# Patient Record
Sex: MALE | HISPANIC OR LATINO | Employment: FULL TIME | URBAN - METROPOLITAN AREA
[De-identification: names, ages, dates, MRNs, and addresses within clinical notes are randomized per-mention and may not be internally consistent; named-entity substitution may affect disease eponyms.]

---

## 2019-08-29 ENCOUNTER — OFFICE VISIT (OUTPATIENT)
Dept: CARDIOLOGY | Facility: MEDICAL CENTER | Age: 59
End: 2019-08-29
Payer: COMMERCIAL

## 2019-08-29 VITALS
OXYGEN SATURATION: 96 % | HEIGHT: 61 IN | HEART RATE: 60 BPM | DIASTOLIC BLOOD PRESSURE: 80 MMHG | WEIGHT: 170 LBS | SYSTOLIC BLOOD PRESSURE: 154 MMHG | BODY MASS INDEX: 32.1 KG/M2

## 2019-08-29 DIAGNOSIS — I45.10 RBBB: ICD-10-CM

## 2019-08-29 DIAGNOSIS — R00.2 PALPITATION: ICD-10-CM

## 2019-08-29 DIAGNOSIS — I10 ESSENTIAL HYPERTENSION, BENIGN: ICD-10-CM

## 2019-08-29 LAB — EKG IMPRESSION: NORMAL

## 2019-08-29 PROCEDURE — 99244 OFF/OP CNSLTJ NEW/EST MOD 40: CPT | Performed by: INTERNAL MEDICINE

## 2019-08-29 PROCEDURE — 93000 ELECTROCARDIOGRAM COMPLETE: CPT | Performed by: INTERNAL MEDICINE

## 2019-08-29 RX ORDER — PROPRANOLOL HYDROCHLORIDE 10 MG/1
10 TABLET ORAL 3 TIMES DAILY
Qty: 90 TAB | Refills: 11 | Status: SHIPPED | OUTPATIENT
Start: 2019-08-29 | End: 2020-09-23

## 2019-08-29 RX ORDER — PROPRANOLOL HYDROCHLORIDE 160 MG/1
160 CAPSULE, EXTENDED RELEASE ORAL
COMMUNITY
End: 2021-12-14

## 2019-08-29 RX ORDER — LOSARTAN POTASSIUM AND HYDROCHLOROTHIAZIDE 25; 100 MG/1; MG/1
1 TABLET ORAL DAILY
COMMUNITY

## 2019-08-29 RX ORDER — AMLODIPINE BESYLATE 10 MG/1
10 TABLET ORAL DAILY
COMMUNITY

## 2019-08-29 ASSESSMENT — ENCOUNTER SYMPTOMS
PALPITATIONS: 1
EYES NEGATIVE: 1
WEIGHT LOSS: 0
NECK PAIN: 1
RESPIRATORY NEGATIVE: 1
GASTROINTESTINAL NEGATIVE: 1
NEUROLOGICAL NEGATIVE: 1
PSYCHIATRIC NEGATIVE: 1

## 2019-08-29 NOTE — PROGRESS NOTES
Chief Complaint   Patient presents with   • Palpitations     new patient       Subjective:   David Barrett is a 58 y.o. male who presents today for evaluation of above issues    He is seen in consultation at the request of Dr. Cathie Ramos for palpitations and abnormal EKG.  He reports occasional palpitations, especially when he drinks ETOH.  They occur once every month or two, lasting upto several hours  He denies any associated symptoms.  He has no limitation, working in the kitchen.    He has HTN for about 5 years.  BP at home per report has mostly been in the range but he thinks would go up in the 150-160 when he gets upset.  His PCP thinks he has some component of white coat hypertension.  There has been no recent medication change.     He was seen by a cardiologist in [facility] last month. He stated that he had several tests performed including echocardiography and was told that his heart was fine.    Past Medical History:   Diagnosis Date   • Arthritis    • Essential hypertension 2/19/2016   • HTN (hypertension)    • SVT (supraventricular tachycardia) (HCC) 2/19/2016     History reviewed. No pertinent surgical history.  History reviewed. No pertinent family history.  Social History     Socioeconomic History   • Marital status:      Spouse name: Not on file   • Number of children: Not on file   • Years of education: Not on file   • Highest education level: Not on file   Occupational History   • Not on file   Social Needs   • Financial resource strain: Not on file   • Food insecurity:     Worry: Not on file     Inability: Not on file   • Transportation needs:     Medical: Not on file     Non-medical: Not on file   Tobacco Use   • Smoking status: Never Smoker   • Smokeless tobacco: Never Used   Substance and Sexual Activity   • Alcohol use: No   • Drug use: No   • Sexual activity: Not on file   Lifestyle   • Physical activity:     Days per week: Not on file     Minutes per session: Not on file   •  Stress: Not on file   Relationships   • Social connections:     Talks on phone: Not on file     Gets together: Not on file     Attends Yarsani service: Not on file     Active member of club or organization: Not on file     Attends meetings of clubs or organizations: Not on file     Relationship status: Not on file   • Intimate partner violence:     Fear of current or ex partner: Not on file     Emotionally abused: Not on file     Physically abused: Not on file     Forced sexual activity: Not on file   Other Topics Concern   • Not on file   Social History Narrative   • Not on file     Not on File  Outpatient Encounter Medications as of 8/29/2019   Medication Sig Dispense Refill   • amLODIPine (NORVASC) 10 MG Tab Take 10 mg by mouth every day.     • propranolol CR (INDERAL LA) 160 MG CAPSULE SR 24 HR capsule Take 160 mg by mouth.     • losartan-hydrochlorothiazide (HYZAAR) 100-25 MG per tablet Take 1 Tab by mouth every day.     • atorvastatin (LIPITOR) 40 MG Tab Take 40 mg by mouth every bedtime.     • allopurinol (ZYLOPRIM) 300 MG Tab Take 300 mg by mouth every bedtime.     • omeprazole (PRILOSEC) 20 MG delayed-release capsule Take 1 Cap by mouth every day. (Patient not taking: Reported on 8/29/2019) 30 Cap 0   • [DISCONTINUED] diltiazem (CARDIZEM) 120 MG Tab Take 1 Tab by mouth every day. (Patient not taking: Reported on 8/29/2019) 30 Tab 0   • meloxicam (MOBIC) 15 MG tablet Take 15 mg by mouth every day.     • [DISCONTINUED] losartan (COZAAR) 100 MG Tab Take 100 mg by mouth every day.     • [DISCONTINUED] hydrochlorothiazide (HYDRODIURIL) 12.5 MG tablet Take 12.5 mg by mouth every day.       No facility-administered encounter medications on file as of 8/29/2019.      Review of Systems   Constitutional: Negative for malaise/fatigue and weight loss.   HENT: Negative.    Eyes: Negative.    Respiratory: Negative.    Cardiovascular: Positive for palpitations. Negative for chest pain.   Gastrointestinal: Negative.   "  Genitourinary: Negative.    Musculoskeletal: Positive for neck pain.   Skin: Negative.    Neurological: Negative.    Endo/Heme/Allergies: Negative.    Psychiatric/Behavioral: Negative.    All other systems reviewed and are negative.       Objective:   /80 (BP Location: Left arm, Patient Position: Sitting)   Pulse 60   Ht 1.549 m (5' 1\")   Wt 77.1 kg (170 lb)   SpO2 96%   BMI 32.12 kg/m²     Physical Exam   Constitutional: He is oriented to person, place, and time. No distress.   HENT:   Head: Normocephalic and atraumatic.   Eyes: Right eye exhibits no discharge. Left eye exhibits no discharge.   Neck: No JVD present. No thyromegaly present.   Cardiovascular: Normal rate and regular rhythm. Exam reveals no gallop.   No murmur heard.  Pulmonary/Chest: Effort normal. No respiratory distress. He has no wheezes. He has no rales.   Abdominal: Soft.   Musculoskeletal: He exhibits no edema.   Neurological: He is alert and oriented to person, place, and time.   Skin: Skin is warm. No erythema.   Psychiatric: He has a normal mood and affect.     EKG today by my review showed sinus bradycardia, first degree AV block and RBBB    CXR 2016 showed no cardiomegaly    Assessment:     1. Palpitations    2. RBBB     3. Essential hypertension, benign         Medical Decision Making:  Today's Assessment / Status / Plan:     He described intermittent tachycardia relatively long lasting but somewhat infrequent.   He is already taking propranolol with slow resting heart rate.  Will arrange for event monitor.  He is rather concerned about them. Will add propanolol 10 mg PRN for now..  Will try to obtain record on those tests from [facility].  Will try to obtain his record from [facility] for rview.  His BP is somewhat elevated but there is suggestion of white coat syndrome in the past.  Will continue current medications and closely monitor for now.  Will have the patient return for a followup in 4-6 weeks. Will be happy to " see the patient sooner as needed. Thank you for allowing me to participate in the caring of this patient.

## 2019-08-29 NOTE — PROGRESS NOTES
Pt is seen in this office.  Per MD recommendations, pt to have lifewatch monitor.    Lifewatch Monitor ordered.

## 2019-09-25 ENCOUNTER — NON-PROVIDER VISIT (OUTPATIENT)
Dept: CARDIOLOGY | Facility: MEDICAL CENTER | Age: 59
End: 2019-09-25
Payer: COMMERCIAL

## 2019-09-25 ENCOUNTER — TELEPHONE (OUTPATIENT)
Dept: CARDIOLOGY | Facility: MEDICAL CENTER | Age: 59
End: 2019-09-25

## 2019-09-25 DIAGNOSIS — R00.2 PALPITATION: ICD-10-CM

## 2019-09-25 DIAGNOSIS — R00.1 SINUS BRADYCARDIA: ICD-10-CM

## 2019-09-25 DIAGNOSIS — I45.10 RBBB: ICD-10-CM

## 2019-09-25 PROCEDURE — 93268 ECG RECORD/REVIEW: CPT | Performed by: INTERNAL MEDICINE

## 2019-09-25 NOTE — TELEPHONE ENCOUNTER
Patient enrolled in the 21 day [employer] [facility] Heart monitoring program.  >In office hookup with Baseline transmitted.  >Pending EOS.

## 2019-10-22 ENCOUNTER — TELEPHONE (OUTPATIENT)
Dept: CARDIOLOGY | Facility: MEDICAL CENTER | Age: 59
End: 2019-10-22

## 2019-10-22 NOTE — TELEPHONE ENCOUNTER
----- Message from Stephani Gruber M.D. sent at 10/17/2019  1:20 PM PDT -----  Monitor showed occ slow HR but mostly during sleep  No other abn heart rhythm  Monitor for now unless symptoms worsen      =======================================================      This covering RN today attempted to call pt, no answer, left vm to call back     Pt have appt tomorrow 10/23/19 w/ CR

## 2019-10-23 ENCOUNTER — OFFICE VISIT (OUTPATIENT)
Dept: CARDIOLOGY | Facility: MEDICAL CENTER | Age: 59
End: 2019-10-23
Payer: COMMERCIAL

## 2019-10-23 VITALS
WEIGHT: 178 LBS | BODY MASS INDEX: 33.63 KG/M2 | HEART RATE: 54 BPM | DIASTOLIC BLOOD PRESSURE: 60 MMHG | SYSTOLIC BLOOD PRESSURE: 140 MMHG | OXYGEN SATURATION: 97 %

## 2019-10-23 DIAGNOSIS — I45.10 RBBB: ICD-10-CM

## 2019-10-23 DIAGNOSIS — R00.2 PALPITATION: ICD-10-CM

## 2019-10-23 DIAGNOSIS — I10 ESSENTIAL HYPERTENSION, BENIGN: ICD-10-CM

## 2019-10-23 PROCEDURE — 99214 OFFICE O/P EST MOD 30 MIN: CPT | Performed by: INTERNAL MEDICINE

## 2019-10-23 ASSESSMENT — ENCOUNTER SYMPTOMS
SHORTNESS OF BREATH: 0
DIZZINESS: 0
PALPITATIONS: 0

## 2019-10-23 NOTE — PROGRESS NOTES
Chief Complaint   Patient presents with   • Palpitations     follow up (used tranlation line cart)   HTN    Subjective:   David Barrett is a 58 y.o. male who presents today for f/u above issues    Feeling better  He thinks taking short acting propranolol is helping  He has been it TID in addition to propranolol  mg/d  Event monitor earlier this month showed occ bradycardia during sleep but no tahcyaarhythmias  BP at home usually in the 120     Past Medical History:   Diagnosis Date   • Arthritis    • Essential hypertension 2/19/2016   • HTN (hypertension)    • SVT (supraventricular tachycardia) (HCC) 2/19/2016     History reviewed. No pertinent surgical history.  History reviewed. No pertinent family history.  Social History     Socioeconomic History   • Marital status:      Spouse name: Not on file   • Number of children: Not on file   • Years of education: Not on file   • Highest education level: Not on file   Occupational History   • Not on file   Social Needs   • Financial resource strain: Not on file   • Food insecurity:     Worry: Not on file     Inability: Not on file   • Transportation needs:     Medical: Not on file     Non-medical: Not on file   Tobacco Use   • Smoking status: Never Smoker   • Smokeless tobacco: Never Used   Substance and Sexual Activity   • Alcohol use: No   • Drug use: No   • Sexual activity: Not on file   Lifestyle   • Physical activity:     Days per week: Not on file     Minutes per session: Not on file   • Stress: Not on file   Relationships   • Social connections:     Talks on phone: Not on file     Gets together: Not on file     Attends Zoroastrianism service: Not on file     Active member of club or organization: Not on file     Attends meetings of clubs or organizations: Not on file     Relationship status: Not on file   • Intimate partner violence:     Fear of current or ex partner: Not on file     Emotionally abused: Not on file     Physically abused: Not on file      Forced sexual activity: Not on file   Other Topics Concern   • Not on file   Social History Narrative   • Not on file     Not on File  Outpatient Encounter Medications as of 10/23/2019   Medication Sig Dispense Refill   • amLODIPine (NORVASC) 10 MG Tab Take 10 mg by mouth every day.     • propranolol CR (INDERAL LA) 160 MG CAPSULE SR 24 HR capsule Take 160 mg by mouth.     • losartan-hydrochlorothiazide (HYZAAR) 100-25 MG per tablet Take 1 Tab by mouth every day.     • propranolol (INDERAL) 10 MG Tab Take 1 Tab by mouth 3 times a day. 90 Tab 11   • atorvastatin (LIPITOR) 40 MG Tab Take 40 mg by mouth every bedtime.     • allopurinol (ZYLOPRIM) 300 MG Tab Take 300 mg by mouth every bedtime.     • omeprazole (PRILOSEC) 20 MG delayed-release capsule Take 1 Cap by mouth every day. (Patient not taking: Reported on 8/29/2019) 30 Cap 0   • meloxicam (MOBIC) 15 MG tablet Take 15 mg by mouth every day.       No facility-administered encounter medications on file as of 10/23/2019.      Review of Systems   Constitutional: Negative for malaise/fatigue.   Respiratory: Negative for shortness of breath.    Cardiovascular: Negative for chest pain and palpitations.   Neurological: Negative for dizziness.   Psychiatric/Behavioral:        Usually feels anxious in MD's office        Objective:   /60 (BP Location: Left arm, Patient Position: Sitting)   Pulse (!) 54   Wt 80.7 kg (178 lb)   SpO2 97%   BMI 33.63 kg/m²     Physical Exam   Constitutional: He is oriented to person, place, and time. No distress.   HENT:   Head: Atraumatic.   Eyes: Left eye exhibits discharge.   Neck: No JVD present. No thyromegaly present.   Cardiovascular: Regular rhythm. Exam reveals no gallop.   No murmur heard.  Pulmonary/Chest: Effort normal and breath sounds normal.   Abdominal: Soft. He exhibits no distension.   Musculoskeletal: He exhibits no edema.   Neurological: He is alert and oriented to person, place, and time.   Skin: Skin is warm.    Psychiatric: His behavior is normal.       Assessment:     1. Palpitation     2. Essential hypertension, benign     3. RBBB         Medical Decision Making:  Today's Assessment / Status / Plan:     The patient's above cardiovascular conditions are stable.   Will continue current medications and have the patient return for a followup in 6 months.   Will be happy to see the patient sooner as needed.   Thank you for allowing me to participate in the caring of this patient.

## 2020-09-20 DIAGNOSIS — I10 ESSENTIAL HYPERTENSION: ICD-10-CM

## 2020-09-20 DIAGNOSIS — I47.10 SVT (SUPRAVENTRICULAR TACHYCARDIA) (HCC): ICD-10-CM

## 2020-09-24 RX ORDER — PROPRANOLOL HYDROCHLORIDE 10 MG/1
TABLET ORAL
Qty: 90 TAB | Refills: 0 | Status: SHIPPED | OUTPATIENT
Start: 2020-09-24 | End: 2021-08-04 | Stop reason: SDUPTHER

## 2021-03-15 DIAGNOSIS — Z23 NEED FOR VACCINATION: ICD-10-CM

## 2021-08-04 DIAGNOSIS — I47.10 SVT (SUPRAVENTRICULAR TACHYCARDIA) (HCC): ICD-10-CM

## 2021-08-04 DIAGNOSIS — I10 ESSENTIAL HYPERTENSION: ICD-10-CM

## 2021-08-04 RX ORDER — PROPRANOLOL HYDROCHLORIDE 10 MG/1
10 TABLET ORAL 3 TIMES DAILY
Qty: 90 TABLET | Refills: 0 | Status: SHIPPED | OUTPATIENT
Start: 2021-08-04

## 2021-08-04 NOTE — TELEPHONE ENCOUNTER
Lia Montoya, Med Ass't  You 1 hour ago (1:52 PM)      Schedulers have been sent a message to follow up with patient for F/V appointment. Additional note has been placed for patient to schedule follow up visit and [employer] message has been sent. 30 day supply issued.  Thank you

## 2021-08-10 ENCOUNTER — TELEPHONE (OUTPATIENT)
Dept: CARDIOLOGY | Facility: MEDICAL CENTER | Age: 61
End: 2021-08-10

## 2021-08-10 NOTE — TELEPHONE ENCOUNTER
Left voicemail to schedule a follow up visit via .   SLC      ----- Message from Esvin Piper Ass't sent at 8/4/2021  1:50 PM PDT -----  Regarding: Need Follow Appointment  Please contact patient to schedule follow up appointment. Thank you

## 2021-10-08 DIAGNOSIS — I47.10 SVT (SUPRAVENTRICULAR TACHYCARDIA) (HCC): ICD-10-CM

## 2021-10-08 DIAGNOSIS — I10 ESSENTIAL HYPERTENSION: ICD-10-CM

## 2021-10-08 NOTE — LETTER
October 12, 2021                David Barrett      [address]      [place] [address]        David:      No podemos volver a llenar gupta propanolol en lois momento. Debe ser visto anualmente para que se vuelvan a surtir earl medicamentos recetados. Llame a [facility] para hacer carina fidel al [phone].     También, puede solicitar resurtidos a gupta médico de atención primaria si los ve con más frecuencia.    Kierra,     [facility]

## 2021-10-12 RX ORDER — PROPRANOLOL HYDROCHLORIDE 10 MG/1
TABLET ORAL
Qty: 90 TABLET | Refills: 2 | OUTPATIENT
Start: 2021-10-12

## 2021-10-12 NOTE — TELEPHONE ENCOUNTER
Last seen by Dr. Gruber (who is no longer here) 10/2019.     Scheduling contacted pt via interpretor 8/10 and advised that a follow up appt was needed.     Cannot refill medication at this time.    Letter (translated in Turkmen) mailed to patients home address regarding the above and with contact information included. .

## 2021-12-14 ENCOUNTER — OFFICE VISIT (OUTPATIENT)
Dept: CARDIOLOGY | Facility: MEDICAL CENTER | Age: 61
End: 2021-12-14
Payer: COMMERCIAL

## 2021-12-14 VITALS
HEART RATE: 62 BPM | RESPIRATION RATE: 16 BRPM | WEIGHT: 192 LBS | OXYGEN SATURATION: 99 % | HEIGHT: 63 IN | SYSTOLIC BLOOD PRESSURE: 170 MMHG | BODY MASS INDEX: 34.02 KG/M2 | DIASTOLIC BLOOD PRESSURE: 80 MMHG

## 2021-12-14 DIAGNOSIS — I47.10 SVT (SUPRAVENTRICULAR TACHYCARDIA) (HCC): ICD-10-CM

## 2021-12-14 DIAGNOSIS — R07.89 OTHER CHEST PAIN: Primary | ICD-10-CM

## 2021-12-14 DIAGNOSIS — I10 ESSENTIAL HYPERTENSION: ICD-10-CM

## 2021-12-14 DIAGNOSIS — R00.2 PALPITATIONS: ICD-10-CM

## 2021-12-14 DIAGNOSIS — I45.10 RIGHT BUNDLE BRANCH BLOCK (RBBB): ICD-10-CM

## 2021-12-14 DIAGNOSIS — R94.31 NONSPECIFIC ABNORMAL ELECTROCARDIOGRAM (ECG) (EKG): ICD-10-CM

## 2021-12-14 LAB — EKG IMPRESSION: NORMAL

## 2021-12-14 PROCEDURE — 93000 ELECTROCARDIOGRAM COMPLETE: CPT | Performed by: INTERNAL MEDICINE

## 2021-12-14 PROCEDURE — 99215 OFFICE O/P EST HI 40 MIN: CPT | Performed by: INTERNAL MEDICINE

## 2021-12-14 RX ORDER — LISINOPRIL 10 MG/1
10 TABLET ORAL DAILY
Qty: 30 TABLET | Refills: 11 | Status: SHIPPED | OUTPATIENT
Start: 2021-12-14

## 2021-12-14 ASSESSMENT — ENCOUNTER SYMPTOMS
EYES NEGATIVE: 1
DEPRESSION: 0
DIZZINESS: 0
WEAKNESS: 0
INSOMNIA: 1
BLURRED VISION: 0
NERVOUS/ANXIOUS: 1
VOMITING: 0
ABDOMINAL PAIN: 0
FEVER: 0
BRUISES/BLEEDS EASILY: 0
HEADACHES: 0
FOCAL WEAKNESS: 0
NAUSEA: 0
WEIGHT LOSS: 0
DOUBLE VISION: 0
NEUROLOGICAL NEGATIVE: 1
CONSTITUTIONAL NEGATIVE: 1
RESPIRATORY NEGATIVE: 1
NECK PAIN: 1
SHORTNESS OF BREATH: 0
MYALGIAS: 0
CHILLS: 0
PALPITATIONS: 1
COUGH: 0
GASTROINTESTINAL NEGATIVE: 1
CLAUDICATION: 0

## 2021-12-14 NOTE — PROGRESS NOTES
Chief Complaint   Patient presents with   • Palpitations   • Supraventricular Tachycardia (SVT)   • Hypertension     F/V Dx: Essential hypertension       Subjective     David Barrett is a 61 y.o. male who presents today for new patient establishment for neck pain.    Since the patient's last visit on 10/23/19 with Stephani Najera, he has been doing well clinically until 3 days ago when he began to experience neck pain. He describes his neck pain to be significant neck pain without radiation. He admits to palpitations following his COVID diagnoses last year treated with propranolol. He denies chest pain, shortness of breath, nausea/vomiting or diaphoresis.He denies aggravating or alleviating factors. He is under significant stress following separation from his wife 5 years ago.     Past Medical History:   Diagnosis Date   • Arthritis    • Essential hypertension 2/19/2016   • HTN (hypertension)    • SVT (supraventricular tachycardia) (HCC) 2/19/2016     Past Surgical History:   Procedure Laterality Date   • HERNIA REPAIR     • NEPHROLITHOTOMY       Family History   Problem Relation Age of Onset   • Heart Disease Neg Hx      Social History     Socioeconomic History   • Marital status:      Spouse name: Not on file   • Number of children: Not on file   • Years of education: Not on file   • Highest education level: Not on file   Occupational History   • Not on file   Tobacco Use   • Smoking status: Never Smoker   • Smokeless tobacco: Never Used   Substance and Sexual Activity   • Alcohol use: No   • Drug use: No   • Sexual activity: Not on file   Other Topics Concern   • Not on file   Social History Narrative   • Not on file     Social Determinants of Health     Financial Resource Strain:    • Difficulty of Paying Living Expenses: Not on file   Food Insecurity:    • Worried About Running Out of Food in the Last Year: Not on file   • Ran Out of Food in the Last Year: Not on file   Transportation  Needs:    • Lack of Transportation (Medical): Not on file   • Lack of Transportation (Non-Medical): Not on file   Physical Activity:    • Days of Exercise per Week: Not on file   • Minutes of Exercise per Session: Not on file   Stress:    • Feeling of Stress : Not on file   Social Connections:    • Frequency of Communication with Friends and Family: Not on file   • Frequency of Social Gatherings with Friends and Family: Not on file   • Attends Voodoo Services: Not on file   • Active Member of Clubs or Organizations: Not on file   • Attends Club or Organization Meetings: Not on file   • Marital Status: Not on file   Intimate Partner Violence:    • Fear of Current or Ex-Partner: Not on file   • Emotionally Abused: Not on file   • Physically Abused: Not on file   • Sexually Abused: Not on file   Housing Stability:    • Unable to Pay for Housing in the Last Year: Not on file   • Number of Places Lived in the Last Year: Not on file   • Unstable Housing in the Last Year: Not on file     No Known Allergies     (Medications reviewed.)  Outpatient Encounter Medications as of 12/14/2021   Medication Sig Dispense Refill   • propranolol (INDERAL) 10 MG Tab Take 1 tablet by mouth 3 times a day. 90 tablet 0   • amLODIPine (NORVASC) 10 MG Tab Take 10 mg by mouth every day.     • losartan-hydrochlorothiazide (HYZAAR) 100-25 MG per tablet Take 1 Tab by mouth every day.     • atorvastatin (LIPITOR) 40 MG Tab Take 40 mg by mouth every bedtime.     • allopurinol (ZYLOPRIM) 300 MG Tab Take 300 mg by mouth every bedtime.     • [DISCONTINUED] propranolol CR (INDERAL LA) 160 MG CAPSULE SR 24 HR capsule Take 160 mg by mouth. (Patient not taking: Reported on 12/14/2021)     • [DISCONTINUED] omeprazole (PRILOSEC) 20 MG delayed-release capsule Take 1 Cap by mouth every day. (Patient not taking: Reported on 8/29/2019) 30 Cap 0   • [DISCONTINUED] meloxicam (MOBIC) 15 MG tablet Take 15 mg by mouth every day. (Patient not taking: Reported on  "12/14/2021)       No facility-administered encounter medications on file as of 12/14/2021.     Review of Systems   Constitutional: Negative.  Negative for chills, fever, malaise/fatigue and weight loss.   HENT: Negative.  Negative for hearing loss.    Eyes: Negative.  Negative for blurred vision and double vision.   Respiratory: Negative.  Negative for cough and shortness of breath.    Cardiovascular: Positive for chest pain and palpitations. Negative for claudication and leg swelling.   Gastrointestinal: Negative.  Negative for abdominal pain, nausea and vomiting.   Genitourinary: Negative.  Negative for dysuria and urgency.   Musculoskeletal: Positive for neck pain. Negative for joint pain and myalgias.   Skin: Negative.  Negative for itching and rash.   Neurological: Negative.  Negative for dizziness, focal weakness, weakness and headaches.   Endo/Heme/Allergies: Negative.  Does not bruise/bleed easily.   Psychiatric/Behavioral: Negative for depression. The patient is nervous/anxious and has insomnia.    He is Portuguese speaking interpreted by Steff.           Objective     BP (!) 170/80 (BP Location: Left arm, Patient Position: Sitting, BP Cuff Size: Adult)   Pulse 62   Resp 16   Ht 1.6 m (5' 3\")   Wt 87.1 kg (192 lb)   SpO2 99%   BMI 34.01 kg/m²     Physical Exam  Constitutional:       Appearance: He is well-developed.   HENT:      Head: Normocephalic and atraumatic.   Neck:      Vascular: No JVD.   Cardiovascular:      Rate and Rhythm: Normal rate and regular rhythm.      Heart sounds: Normal heart sounds.   Pulmonary:      Effort: Pulmonary effort is normal.      Breath sounds: Normal breath sounds.   Abdominal:      General: Bowel sounds are normal.      Palpations: Abdomen is soft.      Comments: No hepatosplenomegaly.   Musculoskeletal:         General: Normal range of motion.   Lymphadenopathy:      Cervical: No cervical adenopathy.   Skin:     General: Skin is warm and dry.   Neurological:      " Mental Status: He is alert and oriented to person, place, and time.            CARDIAC STUDIES/PROCEDURES:    EKG was ordered for palpitations, performed on (12/14/21) was reviewed: EKG, personally interpreted shows sinus bradycardia with right bundle branch block.    Assessment & Plan     1. Other chest pain     2. Nonspecific abnormal electrocardiogram (ECG) (EKG)     3. Right bundle branch block (RBBB)     4. Essential hypertension     5. Palpitation  EKG   6. SVT (supraventricular tachycardia) (HCC)  EKG       Medical Decision Making: Today's Assessment/Status/Plan:        1. Chest pain: He is experiencing neck pain and his EKG was abnormal with right bundle branch block. We will perform a myocardial perfusion imaging study and an echocardiogram and follow up with him.   2. Hypertension: Blood pressure has been high. We will start lisinopril and reassess the blood pressure.  3. Palpitations: He is experiencing mild palpitations on propranolol.  Greater than 45 minutes of time was spent to review all above information; of which more than 50% of the time was face to face reviewing thee patient's medical issues, medication evaluation, study result review, lab results review.    We will follow up in 3 months.    CC Merly Dawn

## 2021-12-22 ENCOUNTER — TELEPHONE (OUTPATIENT)
Dept: CARDIOLOGY | Facility: MEDICAL CENTER | Age: 61
End: 2021-12-22

## 2021-12-22 DIAGNOSIS — I45.10 RIGHT BUNDLE BRANCH BLOCK (RBBB): ICD-10-CM

## 2021-12-22 DIAGNOSIS — R94.31 NONSPECIFIC ABNORMAL ELECTROCARDIOGRAM (ECG) (EKG): ICD-10-CM

## 2021-12-22 DIAGNOSIS — R07.89 OTHER CHEST PAIN: ICD-10-CM

## 2021-12-22 DIAGNOSIS — R00.2 PALPITATIONS: ICD-10-CM

## 2021-12-22 DIAGNOSIS — I47.10 SVT (SUPRAVENTRICULAR TACHYCARDIA) (HCC): ICD-10-CM

## 2021-12-22 NOTE — TELEPHONE ENCOUNTER
Called Merly back, and she said the auth for patient's stress test is still pending, so a clinical member needs to call to provide more information.    Pending auth# [id number]  [facility] Phone# [phone]    Call Ref# [id number]    Spoke with Dr. Mason who states they received a 2019 progress note where patient wasn't symptomatic. She recommends we upload most recent progress note through [facility] system.     Called and notified Merly who said she already uploaded the new documents but she will upload again. Will call back in about an hour to make sure it has been received.

## 2021-12-22 NOTE — TELEPHONE ENCOUNTER
Called OLIVIER back to check on status.    Spoke with Dr. Thomas with insurance auth who said with patient's symptoms, EKG results, he is not approved for [place] stress test and she recommends treadmill stress test.     Talked with laurita Magallon to order treadmill stress.     Used [employer] ID# [id number] to [facility] with patient notifying of the cancellation and to call [phone] to reschedule alternate test. Gave office phone number if he has any other questions.

## 2021-12-22 NOTE — TELEPHONE ENCOUNTER
KEN Moreland from [employer] called in and has some questions regarding the nuclear cardiac stress test that the pt is scheduled for on 12-.      Best contact for Merly  PH: [phone]    Thank you,  Keila GARCIA

## 2021-12-23 ENCOUNTER — APPOINTMENT (OUTPATIENT)
Dept: RADIOLOGY | Facility: MEDICAL CENTER | Age: 61
End: 2021-12-23
Attending: INTERNAL MEDICINE
Payer: COMMERCIAL

## 2022-03-14 ENCOUNTER — TELEPHONE (OUTPATIENT)
Dept: CARDIOLOGY | Facility: MEDICAL CENTER | Age: 62
End: 2022-03-14
Payer: COMMERCIAL

## 2022-03-14 NOTE — TELEPHONE ENCOUNTER
Spoke w/ pt on 03/14/22 to r/s the n/s from 03/11/22. Pt has [facility] so unfortunately will not be rescheduling due to our facility no longer being contracted w/ his insurance, he will be seeking services elsewhere.